# Patient Record
Sex: MALE | Race: WHITE | Employment: FULL TIME | URBAN - METROPOLITAN AREA
[De-identification: names, ages, dates, MRNs, and addresses within clinical notes are randomized per-mention and may not be internally consistent; named-entity substitution may affect disease eponyms.]

---

## 2019-10-31 ENCOUNTER — OFFICE VISIT (OUTPATIENT)
Dept: FAMILY MEDICINE CLINIC | Age: 44
End: 2019-10-31
Payer: COMMERCIAL

## 2019-10-31 VITALS
TEMPERATURE: 98.3 F | DIASTOLIC BLOOD PRESSURE: 80 MMHG | WEIGHT: 256 LBS | SYSTOLIC BLOOD PRESSURE: 132 MMHG | OXYGEN SATURATION: 98 % | HEIGHT: 73 IN | BODY MASS INDEX: 33.93 KG/M2 | RESPIRATION RATE: 13 BRPM | HEART RATE: 76 BPM

## 2019-10-31 DIAGNOSIS — G51.0 BELL'S PALSY: Primary | ICD-10-CM

## 2019-10-31 PROCEDURE — 99213 OFFICE O/P EST LOW 20 MIN: CPT | Performed by: INTERNAL MEDICINE

## 2019-10-31 RX ORDER — PREDNISONE 20 MG/1
60 TABLET ORAL DAILY
Qty: 15 TABLET | Refills: 0 | Status: SHIPPED | OUTPATIENT
Start: 2019-10-31 | End: 2019-11-05

## 2019-10-31 RX ORDER — ACYCLOVIR 400 MG/1
400 TABLET ORAL
Refills: 0 | COMMUNITY
Start: 2019-10-27 | End: 2021-01-23 | Stop reason: ALTCHOICE

## 2019-10-31 RX ORDER — PREDNISONE 50 MG/1
50 TABLET ORAL DAILY
Refills: 0 | COMMUNITY
Start: 2019-10-27 | End: 2021-01-23 | Stop reason: ALTCHOICE

## 2019-10-31 RX ORDER — VALACYCLOVIR HYDROCHLORIDE 1 G/1
1000 TABLET, FILM COATED ORAL 3 TIMES DAILY
Qty: 15 TABLET | Refills: 0 | Status: SHIPPED | OUTPATIENT
Start: 2019-10-31 | End: 2019-11-05

## 2019-10-31 ASSESSMENT — ENCOUNTER SYMPTOMS
CONSTIPATION: 0
COLOR CHANGE: 0
VOMITING: 0
BLOOD IN STOOL: 0
PHOTOPHOBIA: 0
FACIAL SWELLING: 0
NAUSEA: 0
COUGH: 0
ABDOMINAL PAIN: 0
DIARRHEA: 0
EYE DISCHARGE: 0
EYE REDNESS: 0
RECTAL PAIN: 0
CHEST TIGHTNESS: 0
VOICE CHANGE: 0
RHINORRHEA: 0
SINUS PRESSURE: 0
WHEEZING: 0
BACK PAIN: 0
APNEA: 0
SINUS PAIN: 0
ABDOMINAL DISTENTION: 0
EYE ITCHING: 0
SHORTNESS OF BREATH: 0
EYE PAIN: 0
SORE THROAT: 0
TROUBLE SWALLOWING: 0

## 2019-11-02 ENCOUNTER — TELEPHONE (OUTPATIENT)
Dept: FAMILY MEDICINE CLINIC | Age: 44
End: 2019-11-02

## 2019-11-18 ENCOUNTER — OFFICE VISIT (OUTPATIENT)
Dept: FAMILY MEDICINE CLINIC | Age: 44
End: 2019-11-18
Payer: COMMERCIAL

## 2019-11-18 VITALS
TEMPERATURE: 97.9 F | SYSTOLIC BLOOD PRESSURE: 132 MMHG | BODY MASS INDEX: 33.83 KG/M2 | DIASTOLIC BLOOD PRESSURE: 86 MMHG | OXYGEN SATURATION: 98 % | HEART RATE: 96 BPM | WEIGHT: 256.4 LBS

## 2019-11-18 DIAGNOSIS — G51.0 BELL'S PALSY: Primary | ICD-10-CM

## 2019-11-18 DIAGNOSIS — Z00.00 HEALTHCARE MAINTENANCE: ICD-10-CM

## 2019-11-18 PROCEDURE — 99213 OFFICE O/P EST LOW 20 MIN: CPT | Performed by: INTERNAL MEDICINE

## 2019-11-18 ASSESSMENT — ENCOUNTER SYMPTOMS
VOICE CHANGE: 0
SINUS PAIN: 0
TROUBLE SWALLOWING: 0
EYE ITCHING: 0
APNEA: 0
RECTAL PAIN: 0
SORE THROAT: 0
CONSTIPATION: 0
PHOTOPHOBIA: 0
ABDOMINAL PAIN: 0
NAUSEA: 0
COLOR CHANGE: 0
SINUS PRESSURE: 0
ABDOMINAL DISTENTION: 0
EYE PAIN: 0
VOMITING: 0
EYE DISCHARGE: 0
COUGH: 0
DIARRHEA: 0
SHORTNESS OF BREATH: 0
BACK PAIN: 0
CHEST TIGHTNESS: 0
RHINORRHEA: 0
EYE REDNESS: 0
WHEEZING: 0
BLOOD IN STOOL: 0
FACIAL SWELLING: 0

## 2020-01-25 DIAGNOSIS — G51.0 BELL'S PALSY: ICD-10-CM

## 2020-01-25 DIAGNOSIS — Z00.00 HEALTHCARE MAINTENANCE: ICD-10-CM

## 2020-01-25 LAB
ALBUMIN SERPL-MCNC: 4.2 G/DL (ref 3.5–4.6)
ALP BLD-CCNC: 75 U/L (ref 35–104)
ALT SERPL-CCNC: 22 U/L (ref 0–41)
ANION GAP SERPL CALCULATED.3IONS-SCNC: 11 MEQ/L (ref 9–15)
AST SERPL-CCNC: 24 U/L (ref 0–40)
BILIRUB SERPL-MCNC: <0.2 MG/DL (ref 0.2–0.7)
BUN BLDV-MCNC: 15 MG/DL (ref 6–20)
CALCIUM SERPL-MCNC: 9.1 MG/DL (ref 8.5–9.9)
CHLORIDE BLD-SCNC: 105 MEQ/L (ref 95–107)
CHOLESTEROL, TOTAL: 172 MG/DL (ref 0–199)
CO2: 26 MEQ/L (ref 20–31)
CREAT SERPL-MCNC: 1.07 MG/DL (ref 0.7–1.2)
GFR AFRICAN AMERICAN: >60
GFR NON-AFRICAN AMERICAN: >60
GLOBULIN: 2.4 G/DL (ref 2.3–3.5)
GLUCOSE BLD-MCNC: 101 MG/DL (ref 70–99)
HCT VFR BLD CALC: 42.2 % (ref 42–52)
HDLC SERPL-MCNC: 31 MG/DL (ref 40–59)
HEMOGLOBIN: 14.1 G/DL (ref 14–18)
LDL CHOLESTEROL CALCULATED: 116 MG/DL (ref 0–129)
MCH RBC QN AUTO: 28.8 PG (ref 27–31.3)
MCHC RBC AUTO-ENTMCNC: 33.4 % (ref 33–37)
MCV RBC AUTO: 86.2 FL (ref 80–100)
PDW BLD-RTO: 13.1 % (ref 11.5–14.5)
PLATELET # BLD: 341 K/UL (ref 130–400)
POTASSIUM SERPL-SCNC: 4 MEQ/L (ref 3.4–4.9)
RBC # BLD: 4.9 M/UL (ref 4.7–6.1)
SODIUM BLD-SCNC: 142 MEQ/L (ref 135–144)
TOTAL PROTEIN: 6.6 G/DL (ref 6.3–8)
TRIGL SERPL-MCNC: 127 MG/DL (ref 0–150)
WBC # BLD: 6.6 K/UL (ref 4.8–10.8)

## 2021-01-18 ENCOUNTER — OFFICE VISIT (OUTPATIENT)
Dept: FAMILY MEDICINE CLINIC | Age: 46
End: 2021-01-18
Payer: COMMERCIAL

## 2021-01-18 VITALS
DIASTOLIC BLOOD PRESSURE: 80 MMHG | BODY MASS INDEX: 32.47 KG/M2 | SYSTOLIC BLOOD PRESSURE: 110 MMHG | TEMPERATURE: 97 F | RESPIRATION RATE: 16 BRPM | OXYGEN SATURATION: 97 % | WEIGHT: 245 LBS | HEIGHT: 73 IN | HEART RATE: 96 BPM

## 2021-01-18 DIAGNOSIS — R53.83 FATIGUE, UNSPECIFIED TYPE: ICD-10-CM

## 2021-01-18 DIAGNOSIS — R22.1 NECK SWELLING: ICD-10-CM

## 2021-01-18 DIAGNOSIS — R22.1 NECK SWELLING: Primary | ICD-10-CM

## 2021-01-18 DIAGNOSIS — L98.9 SKIN LESION: ICD-10-CM

## 2021-01-18 LAB
ALBUMIN SERPL-MCNC: 4.7 G/DL (ref 3.5–4.6)
ALP BLD-CCNC: 74 U/L (ref 35–104)
ALT SERPL-CCNC: 24 U/L (ref 0–41)
ANION GAP SERPL CALCULATED.3IONS-SCNC: 9 MEQ/L (ref 9–15)
AST SERPL-CCNC: 24 U/L (ref 0–40)
BASOPHILS ABSOLUTE: 0 K/UL (ref 0–0.2)
BASOPHILS RELATIVE PERCENT: 0.3 %
BILIRUB SERPL-MCNC: 0.4 MG/DL (ref 0.2–0.7)
BUN BLDV-MCNC: 13 MG/DL (ref 6–20)
CALCIUM SERPL-MCNC: 9.4 MG/DL (ref 8.5–9.9)
CHLORIDE BLD-SCNC: 102 MEQ/L (ref 95–107)
CHOLESTEROL, TOTAL: 192 MG/DL (ref 0–199)
CO2: 27 MEQ/L (ref 20–31)
CREAT SERPL-MCNC: 1.06 MG/DL (ref 0.7–1.2)
EOSINOPHILS ABSOLUTE: 0.1 K/UL (ref 0–0.7)
EOSINOPHILS RELATIVE PERCENT: 0.9 %
GFR AFRICAN AMERICAN: >60
GFR NON-AFRICAN AMERICAN: >60
GLOBULIN: 2.6 G/DL (ref 2.3–3.5)
GLUCOSE BLD-MCNC: 75 MG/DL (ref 70–99)
HBA1C MFR BLD: 5.6 % (ref 4.8–5.9)
HCT VFR BLD CALC: 43.9 % (ref 42–52)
HDLC SERPL-MCNC: 39 MG/DL (ref 40–59)
HEMOGLOBIN: 14.9 G/DL (ref 14–18)
LDL CHOLESTEROL CALCULATED: 116 MG/DL (ref 0–129)
LYMPHOCYTES ABSOLUTE: 3 K/UL (ref 1–4.8)
LYMPHOCYTES RELATIVE PERCENT: 32.3 %
MCH RBC QN AUTO: 28.8 PG (ref 27–31.3)
MCHC RBC AUTO-ENTMCNC: 33.9 % (ref 33–37)
MCV RBC AUTO: 84.8 FL (ref 80–100)
MONOCYTES ABSOLUTE: 0.4 K/UL (ref 0.2–0.8)
MONOCYTES RELATIVE PERCENT: 4.7 %
NEUTROPHILS ABSOLUTE: 5.7 K/UL (ref 1.4–6.5)
NEUTROPHILS RELATIVE PERCENT: 61.8 %
PDW BLD-RTO: 12.7 % (ref 11.5–14.5)
PLATELET # BLD: 333 K/UL (ref 130–400)
POTASSIUM SERPL-SCNC: 4.1 MEQ/L (ref 3.4–4.9)
RBC # BLD: 5.17 M/UL (ref 4.7–6.1)
SODIUM BLD-SCNC: 138 MEQ/L (ref 135–144)
TOTAL PROTEIN: 7.3 G/DL (ref 6.3–8)
TRIGL SERPL-MCNC: 187 MG/DL (ref 0–150)
TSH REFLEX: 2.63 UIU/ML (ref 0.44–3.86)
VITAMIN D 25-HYDROXY: 17.8 NG/ML (ref 30–100)
WBC # BLD: 9.3 K/UL (ref 4.8–10.8)

## 2021-01-18 PROCEDURE — 99214 OFFICE O/P EST MOD 30 MIN: CPT | Performed by: INTERNAL MEDICINE

## 2021-01-18 RX ORDER — TERBINAFINE HYDROCHLORIDE 250 MG/1
250 TABLET ORAL DAILY
Qty: 42 TABLET | Refills: 0 | Status: SHIPPED | OUTPATIENT
Start: 2021-01-18 | End: 2021-03-01

## 2021-01-18 SDOH — ECONOMIC STABILITY: TRANSPORTATION INSECURITY
IN THE PAST 12 MONTHS, HAS THE LACK OF TRANSPORTATION KEPT YOU FROM MEDICAL APPOINTMENTS OR FROM GETTING MEDICATIONS?: NO

## 2021-01-18 SDOH — ECONOMIC STABILITY: TRANSPORTATION INSECURITY
IN THE PAST 12 MONTHS, HAS LACK OF TRANSPORTATION KEPT YOU FROM MEETINGS, WORK, OR FROM GETTING THINGS NEEDED FOR DAILY LIVING?: NO

## 2021-01-18 SDOH — ECONOMIC STABILITY: INCOME INSECURITY: HOW HARD IS IT FOR YOU TO PAY FOR THE VERY BASICS LIKE FOOD, HOUSING, MEDICAL CARE, AND HEATING?: NOT HARD AT ALL

## 2021-01-18 ASSESSMENT — ENCOUNTER SYMPTOMS
TROUBLE SWALLOWING: 0
RECTAL PAIN: 0
SINUS PRESSURE: 0
CONSTIPATION: 0
EYE DISCHARGE: 0
COLOR CHANGE: 0
BLOOD IN STOOL: 0
ABDOMINAL PAIN: 0
BACK PAIN: 0
SINUS PAIN: 0
EYE PAIN: 0
CHEST TIGHTNESS: 0
SHORTNESS OF BREATH: 0
WHEEZING: 0
SORE THROAT: 0
RHINORRHEA: 0
ABDOMINAL DISTENTION: 0
FACIAL SWELLING: 0
VOICE CHANGE: 0
NAUSEA: 0
EYE REDNESS: 0
PHOTOPHOBIA: 0
COUGH: 0
VOMITING: 0
EYE ITCHING: 0
DIARRHEA: 0
APNEA: 0

## 2021-01-18 ASSESSMENT — PATIENT HEALTH QUESTIONNAIRE - PHQ9
SUM OF ALL RESPONSES TO PHQ QUESTIONS 1-9: 0
SUM OF ALL RESPONSES TO PHQ9 QUESTIONS 1 & 2: 0
2. FEELING DOWN, DEPRESSED OR HOPELESS: 0

## 2021-01-18 NOTE — PATIENT INSTRUCTIONS
Patient Education        DASH Diet: Care Instructions  Your Care Instructions     The DASH diet is an eating plan that can help lower your blood pressure. DASH stands for Dietary Approaches to Stop Hypertension. Hypertension is high blood pressure. The DASH diet focuses on eating foods that are high in calcium, potassium, and magnesium. These nutrients can lower blood pressure. The foods that are highest in these nutrients are fruits, vegetables, low-fat dairy products, nuts, seeds, and legumes. But taking calcium, potassium, and magnesium supplements instead of eating foods that are high in those nutrients does not have the same effect. The DASH diet also includes whole grains, fish, and poultry. The DASH diet is one of several lifestyle changes your doctor may recommend to lower your high blood pressure. Your doctor may also want you to decrease the amount of sodium in your diet. Lowering sodium while following the DASH diet can lower blood pressure even further than just the DASH diet alone. Follow-up care is a key part of your treatment and safety. Be sure to make and go to all appointments, and call your doctor if you are having problems. It's also a good idea to know your test results and keep a list of the medicines you take. How can you care for yourself at home? Following the DASH diet  · Eat 4 to 5 servings of fruit each day. A serving is 1 medium-sized piece of fruit, ½ cup chopped or canned fruit, 1/4 cup dried fruit, or 4 ounces (½ cup) of fruit juice. Choose fruit more often than fruit juice. · Eat 4 to 5 servings of vegetables each day. A serving is 1 cup of lettuce or raw leafy vegetables, ½ cup of chopped or cooked vegetables, or 4 ounces (½ cup) of vegetable juice. Choose vegetables more often than vegetable juice. · Get 2 to 3 servings of low-fat and fat-free dairy each day. A serving is 8 ounces of milk, 1 cup of yogurt, or 1 ½ ounces of cheese.

## 2021-01-18 NOTE — PROGRESS NOTES
Subjective:      Patient ID: Wale Art is a 41 y.o. male who presents today for:  Chief Complaint   Patient presents with    Mass     pt states he has a lump under his chin he would like looked at       HPI     71-year-old male presents with a complaint of left lateral neck swelling. He denies constitutional symptoms such as fevers chills night sweats. The patient also voices concern regarding nuchal mycoses. He would like pharmacologic intervention to help him address this. He also voices a complaint of fatigue and would like a work-up for this concern. At present he denies polyuria,  Polydipsia, constitutional, sinus, additional visual, cardiopulmonary, gastrointestinal, immunologic/hematologic, musculoskeletal, additional neurologic,dermatologic, or psychiatric complaints. No past medical history on file.   Past Surgical History:   Procedure Laterality Date    ENDOSCOPIC ULTRASOUND (LOWER)      TIBIA FRACTURE SURGERY Left     TONSILLECTOMY      UPPER GASTROINTESTINAL ENDOSCOPY       Social History     Socioeconomic History    Marital status: Single     Spouse name: Not on file    Number of children: Not on file    Years of education: Not on file    Highest education level: Not on file   Occupational History    Not on file   Social Needs    Financial resource strain: Not hard at all    Food insecurity     Worry: Never true     Inability: Never true   [employer] needs     Medical: No     Non-medical: No   Tobacco Use    Smoking status: Former Smoker    Smokeless tobacco: Never Used   Substance and Sexual Activity    Alcohol use: Not on file    Drug use: Not on file    Sexual activity: Not on file   Lifestyle    Physical activity     Days per week: Not on file     Minutes per session: Not on file    Stress: Not on file   Relationships    Social connections     Talks on phone: Not on file     Gets together: Not on file     Attends Yazdanism service: Not on file     Active problem, joint swelling, myalgias, neck pain and neck stiffness. Skin: Negative for color change, rash and wound. Allergic/Immunologic: Negative for environmental allergies and food allergies. Neurological: Negative for dizziness, tremors, seizures, syncope, facial asymmetry, speech difficulty, weakness, light-headedness, numbness and headaches. Hematological: Negative for adenopathy. Does not bruise/bleed easily. Psychiatric/Behavioral: Negative for agitation, confusion, decreased concentration, hallucinations, self-injury, sleep disturbance and suicidal ideas. The patient is not nervous/anxious. Objective:   /80   Pulse 96   Temp 97 °F (36.1 °C)   Resp 16   Ht 6' 1\" (1.854 m)   Wt 245 lb (111.1 kg)   SpO2 97%   BMI 32.32 kg/m²     Physical Exam  Constitutional:       General: He is not in acute distress. Appearance: He is well-developed. HENT:      Head: Normocephalic. Right Ear: External ear normal.      Left Ear: External ear normal.   Eyes:      Conjunctiva/sclera: Conjunctivae normal.      Pupils: Pupils are equal, round, and reactive to light. Neck:      Musculoskeletal: Neck supple. Trachea: No tracheal deviation. Comments: Left lateral neck lymphadenopathy noted. Nonpainful to palpation. Cardiovascular:      Rate and Rhythm: Normal rate and regular rhythm. Heart sounds: Normal heart sounds. Pulmonary:      Effort: Pulmonary effort is normal. No respiratory distress. Breath sounds: Normal breath sounds. No wheezing or rales. Chest:      Chest wall: No tenderness. Abdominal:      General: Bowel sounds are normal. There is no distension. Palpations: Abdomen is soft. There is no mass. Tenderness: There is no abdominal tenderness. There is no guarding. Musculoskeletal:         General: No tenderness or deformity. Skin:     General: Skin is warm and dry. Coloration: Skin is not pale. Findings: No erythema or rash. Comments: Faint line on the left lateral elbow forearm. Neurological:      Mental Status: He is alert and oriented to person, place, and time. Psychiatric:         Thought Content: Thought content normal.         Judgment: Judgment normal.         Assessment:       Diagnosis Orders   1. Neck swelling  Hemoglobin A1C    CBC With Auto Differential    CTA NECK W WO CONTRAST    Lipid Panel   2. Fatigue, unspecified type  TSH with Reflex    Testosterone Free And Total Male    Vitamin D 25 Hydroxy    Comprehensive Metabolic Panel    Vitamin B12 & Folate   3. Skin lesion           Plan:      Pilar Boudreaux was seen today for 2 week follow-up. Diagnoses and all orders for this visit:      Left lateral neck swelling: Obtain a CT scan of the neck, CBC with differential        Fatigue: Check TSH, B12 and testosterone level, CMP  CBC with differential and vitamin D      Rash: Left arm: Trial of Eucerin    Health maintenance: We will obtain a lipid profile for further cardiovascular stratification. Return in about 1 month (around 2/18/2021). Ivelisse Garland MD    Please note, this report has been partially produced using speech recognition software  and may cause  and /or contain errors related to that system including grammar, punctuation and spelling as well as words and phrases that may seem inappropriate. If there are questions or concerns please feel free to contact me to clarify.

## 2021-01-19 LAB
SEX HORMONE BINDING GLOBULIN: 30 NMOL/L (ref 11–80)
TESTOSTERONE FREE PERCENT: 2 % (ref 1.6–2.9)
TESTOSTERONE FREE, CALC: 95 PG/ML (ref 47–244)
TESTOSTERONE TOTAL-MALE: 481 NG/DL (ref 300–890)

## 2021-01-19 RX ORDER — ERGOCALCIFEROL 1.25 MG/1
50000 CAPSULE ORAL WEEKLY
Qty: 8 CAPSULE | Refills: 0 | Status: SHIPPED | OUTPATIENT
Start: 2021-01-19 | End: 2021-03-10

## 2021-01-23 ENCOUNTER — HOSPITAL ENCOUNTER (OUTPATIENT)
Dept: CT IMAGING | Age: 46
Discharge: HOME | End: 2021-01-25
Payer: COMMERCIAL

## 2021-01-23 VITALS
RESPIRATION RATE: 16 BRPM | SYSTOLIC BLOOD PRESSURE: 131 MMHG | WEIGHT: 245 LBS | HEART RATE: 77 BPM | BODY MASS INDEX: 32.47 KG/M2 | DIASTOLIC BLOOD PRESSURE: 77 MMHG | HEIGHT: 73 IN

## 2021-01-23 DIAGNOSIS — R22.1 NECK SWELLING: ICD-10-CM

## 2021-01-23 PROCEDURE — 6360000004 HC RX CONTRAST MEDICATION: Performed by: INTERNAL MEDICINE

## 2021-01-23 PROCEDURE — 70498 CT ANGIOGRAPHY NECK: CPT

## 2021-01-23 RX ADMIN — IOPAMIDOL 100 ML: 612 INJECTION, SOLUTION INTRAVENOUS at 08:49

## 2021-02-08 ENCOUNTER — OFFICE VISIT (OUTPATIENT)
Dept: FAMILY MEDICINE CLINIC | Age: 46
End: 2021-02-08
Payer: COMMERCIAL

## 2021-02-08 VITALS
TEMPERATURE: 97 F | WEIGHT: 242 LBS | HEART RATE: 72 BPM | BODY MASS INDEX: 32.07 KG/M2 | OXYGEN SATURATION: 99 % | RESPIRATION RATE: 16 BRPM | SYSTOLIC BLOOD PRESSURE: 120 MMHG | DIASTOLIC BLOOD PRESSURE: 80 MMHG | HEIGHT: 73 IN

## 2021-02-08 DIAGNOSIS — R22.1 NECK SWELLING: ICD-10-CM

## 2021-02-08 DIAGNOSIS — R53.83 FATIGUE, UNSPECIFIED TYPE: Primary | ICD-10-CM

## 2021-02-08 PROCEDURE — 99213 OFFICE O/P EST LOW 20 MIN: CPT | Performed by: INTERNAL MEDICINE

## 2021-02-08 ASSESSMENT — ENCOUNTER SYMPTOMS
SORE THROAT: 0
FACIAL SWELLING: 0
BLOOD IN STOOL: 0
CHEST TIGHTNESS: 0
DIARRHEA: 0
EYE DISCHARGE: 0
ABDOMINAL DISTENTION: 0
APNEA: 0
BACK PAIN: 0
COUGH: 0
EYE ITCHING: 0
TROUBLE SWALLOWING: 0
VOICE CHANGE: 0
SHORTNESS OF BREATH: 0
SINUS PAIN: 0
RHINORRHEA: 0
VOMITING: 0
PHOTOPHOBIA: 0
EYE REDNESS: 0
SINUS PRESSURE: 0
RECTAL PAIN: 0
WHEEZING: 0
COLOR CHANGE: 0
NAUSEA: 0
EYE PAIN: 0
ABDOMINAL PAIN: 0
CONSTIPATION: 0

## 2021-02-08 NOTE — PROGRESS NOTES
file     Active member of club or organization: Not on file     Attends meetings of clubs or organizations: Not on file     Relationship status: Not on file    Intimate partner violence     Fear of current or ex partner: Not on file     Emotionally abused: Not on file     Physically abused: Not on file     Forced sexual activity: Not on file   Other Topics Concern    Not on file   Social History Narrative    Not on file     Family History   Problem Relation Age of Onset    Other Mother         lupus    Diabetes Father      No Known Allergies  Current Outpatient Medications on File Prior to Visit   Medication Sig Dispense Refill    vitamin D (ERGOCALCIFEROL) 1.25 MG (11528 UT) CAPS capsule Take 1 capsule by mouth once a week for 8 doses 8 capsule 0    terbinafine (LAMISIL) 250 MG tablet Take 1 tablet by mouth daily 42 tablet 0     No current facility-administered medications on file prior to visit. Review of Systems   Constitutional: Negative for chills, diaphoresis, fatigue and fever. HENT: Negative for congestion, dental problem, drooling, ear discharge, ear pain, facial swelling, hearing loss, mouth sores, nosebleeds, postnasal drip, rhinorrhea, sinus pressure, sinus pain, sneezing, sore throat, tinnitus, trouble swallowing and voice change. Eyes: Negative for photophobia, pain, discharge, redness, itching and visual disturbance. Respiratory: Negative for apnea, cough, chest tightness, shortness of breath and wheezing. Cardiovascular: Negative for chest pain, palpitations and leg swelling. Gastrointestinal: Negative for abdominal distention, abdominal pain, blood in stool, constipation, diarrhea, nausea, rectal pain and vomiting. Endocrine: Negative for cold intolerance, heat intolerance, polydipsia, polyphagia and polyuria. Genitourinary: Negative for decreased urine volume, difficulty urinating, dysuria, flank pain, frequency, genital sores, hematuria and urgency. Musculoskeletal: Negative for arthralgias, back pain, gait problem, joint swelling, myalgias, neck pain and neck stiffness. Skin: Negative for color change, rash and wound. Allergic/Immunologic: Negative for environmental allergies and food allergies. Neurological: Negative for dizziness, tremors, seizures, syncope, facial asymmetry, speech difficulty, weakness, light-headedness, numbness and headaches. Hematological: Negative for adenopathy. Does not bruise/bleed easily. Psychiatric/Behavioral: Negative for agitation, confusion, decreased concentration, hallucinations, self-injury, sleep disturbance and suicidal ideas. The patient is not nervous/anxious. Objective:   /80   Pulse 72   Temp 97 °F (36.1 °C)   Resp 16   Ht 6' 1\" (1.854 m)   Wt 242 lb (109.8 kg)   SpO2 99%   BMI 31.93 kg/m²     Physical Exam  Constitutional:       General: He is not in acute distress. Appearance: He is well-developed. HENT:      Head: Normocephalic. Right Ear: External ear normal.      Left Ear: External ear normal.   Eyes:      Conjunctiva/sclera: Conjunctivae normal.      Pupils: Pupils are equal, round, and reactive to light. Neck:      Musculoskeletal: Neck supple. Trachea: No tracheal deviation. Comments: Left lateral neck lymphadenopathy noted. Nonpainful to palpation. Cardiovascular:      Rate and Rhythm: Normal rate and regular rhythm. Heart sounds: Normal heart sounds. Pulmonary:      Effort: Pulmonary effort is normal. No respiratory distress. Breath sounds: Normal breath sounds. No wheezing or rales. Chest:      Chest wall: No tenderness. Abdominal:      General: Bowel sounds are normal. There is no distension. Palpations: Abdomen is soft. There is no mass. Tenderness: There is no abdominal tenderness. There is no guarding. Musculoskeletal:         General: No tenderness or deformity. Skin:     General: Skin is warm and dry. Coloration: Skin is not pale. Findings: No erythema or rash. Comments: Faint line on the left lateral elbow forearm. Neurological:      Mental Status: He is alert and oriented to person, place, and time. Psychiatric:         Thought Content: Thought content normal.         Judgment: Judgment normal.         Assessment:       Diagnosis Orders   1. Fatigue, unspecified type     2. Neck swelling           Plan:      Dang Craig was seen today for 2 week follow-up. Diagnoses and all orders for this visit:      Left lateral neck swelling: We will continue to monitor. Fatigue: Check TSH, B12 within normal limits. Awaiting testosterone level. Suspect obstructive sleep apnea may play a role here. Rash: Left arm: Continue Eucerin    Health maintenance: We will obtain a lipid profile for further cardiovascular stratification. Return in about 4 months (around 6/8/2021). Adonis Delarosa MD    Please note, this report has been partially produced using speech recognition software  and may cause  and /or contain errors related to that system including grammar, punctuation and spelling as well as words and phrases that may seem inappropriate. If there are questions or concerns please feel free to contact me to clarify.

## 2022-11-25 ENCOUNTER — HOSPITAL ENCOUNTER (OUTPATIENT)
Dept: MRI IMAGING | Age: 47
Discharge: HOME | End: 2022-11-27
Payer: COMMERCIAL

## 2022-11-25 DIAGNOSIS — M70.50 PATELLAR BURSITIS, UNSPECIFIED LATERALITY: ICD-10-CM

## 2022-11-25 PROCEDURE — 73721 MRI JNT OF LWR EXTRE W/O DYE: CPT

## 2022-12-01 ENCOUNTER — TELEPHONE (OUTPATIENT)
Dept: FAMILY MEDICINE CLINIC | Age: 47
End: 2022-12-01

## 2022-12-01 NOTE — TELEPHONE ENCOUNTER
----- Message from Dr. [facility] ZEINAB sent at 11/29/2022  2:39 PM EST -----  Subject: Referral Request    Reason for referral request? Lab work as needed per Doctor  Provider patient wants to be referred to(if known):     Provider Phone Number(if known): Additional Information for Provider?  Please call if appointment is needed   for Lab  ---------------------------------------------------------------------------  --------------  [id number]    [phone]; OK to leave message on voicemail  ---------------------------------------------------------------------------  --------------

## 2023-03-09 ENCOUNTER — TELEMEDICINE (OUTPATIENT)
Dept: FAMILY MEDICINE CLINIC | Age: 48
End: 2023-03-09
Payer: COMMERCIAL

## 2023-03-09 DIAGNOSIS — N52.9 ERECTILE DYSFUNCTION, UNSPECIFIED ERECTILE DYSFUNCTION TYPE: ICD-10-CM

## 2023-03-09 DIAGNOSIS — R53.83 FATIGUE, UNSPECIFIED TYPE: Primary | ICD-10-CM

## 2023-03-09 PROCEDURE — 99213 OFFICE O/P EST LOW 20 MIN: CPT | Performed by: INTERNAL MEDICINE

## 2023-03-09 SDOH — ECONOMIC STABILITY: HOUSING INSECURITY
IN THE LAST 12 MONTHS, WAS THERE A TIME WHEN YOU DID NOT HAVE A STEADY PLACE TO SLEEP OR SLEPT IN A SHELTER (INCLUDING NOW)?: NO

## 2023-03-09 SDOH — ECONOMIC STABILITY: FOOD INSECURITY: WITHIN THE PAST 12 MONTHS, THE FOOD YOU BOUGHT JUST DIDN'T LAST AND YOU DIDN'T HAVE MONEY TO GET MORE.: NEVER TRUE

## 2023-03-09 SDOH — ECONOMIC STABILITY: FOOD INSECURITY: WITHIN THE PAST 12 MONTHS, YOU WORRIED THAT YOUR FOOD WOULD RUN OUT BEFORE YOU GOT MONEY TO BUY MORE.: NEVER TRUE

## 2023-03-09 SDOH — ECONOMIC STABILITY: INCOME INSECURITY: HOW HARD IS IT FOR YOU TO PAY FOR THE VERY BASICS LIKE FOOD, HOUSING, MEDICAL CARE, AND HEATING?: NOT HARD AT ALL

## 2023-03-09 ASSESSMENT — ENCOUNTER SYMPTOMS
RECTAL PAIN: 0
VOICE CHANGE: 0
BACK PAIN: 0
SINUS PAIN: 0
CONSTIPATION: 0
DIARRHEA: 0
TROUBLE SWALLOWING: 0
VOMITING: 0
EYE PAIN: 0
SHORTNESS OF BREATH: 0
COLOR CHANGE: 0
RHINORRHEA: 0
ABDOMINAL DISTENTION: 0
EYE REDNESS: 0
WHEEZING: 0
FACIAL SWELLING: 0
NAUSEA: 0
CHEST TIGHTNESS: 0
SORE THROAT: 0
COUGH: 0
SINUS PRESSURE: 0
PHOTOPHOBIA: 0
EYE ITCHING: 0
ABDOMINAL PAIN: 0
APNEA: 0
BLOOD IN STOOL: 0
EYE DISCHARGE: 0

## 2023-03-09 ASSESSMENT — PATIENT HEALTH QUESTIONNAIRE - PHQ9
SUM OF ALL RESPONSES TO PHQ QUESTIONS 1-9: 0
SUM OF ALL RESPONSES TO PHQ9 QUESTIONS 1 & 2: 0
SUM OF ALL RESPONSES TO PHQ QUESTIONS 1-9: 0
2. FEELING DOWN, DEPRESSED OR HOPELESS: 0
1. LITTLE INTEREST OR PLEASURE IN DOING THINGS: 0

## 2023-03-09 NOTE — PROGRESS NOTES
Subjective:      Patient ID: Maylin French is a 56 y.o. male Established patient, here for evaluation of the following chief complaint(s):  No chief complaint on file. HPI  64-year-old male presents with a complaint of fatigue that is been present for several weeks and associated erectile dysfunction. At present he denies polyuria,  Polydipsia, constitutional, sinus, visual, cardiopulmonary, urologic, gastrointestinal, immunologic/hematologic, musculoskeletal, neurologic,dermatologic, or psychiatric complaints. Current Outpatient Medications on File Prior to Visit   Medication Sig Dispense Refill    vitamin D (ERGOCALCIFEROL) 1.25 MG (78881 UT) CAPS capsule Take 1 capsule by mouth once a week for 8 doses 8 capsule 0     No current facility-administered medications on file prior to visit. Patient has no known allergies. No past medical history on file.   Past Surgical History:   Procedure Laterality Date    ENDOSCOPIC ULTRASOUND (LOWER)      TIBIA FRACTURE SURGERY Left     TONSILLECTOMY      UPPER GASTROINTESTINAL ENDOSCOPY       Social History     Socioeconomic History    Marital status: Single     Spouse name: Not on file    Number of children: Not on file    Years of education: Not on file    Highest education level: Not on file   Occupational History    Not on file   Tobacco Use    Smoking status: Former    Smokeless tobacco: Never   Vaping Use    Vaping Use: Never used   Substance and Sexual Activity    Alcohol use: Not on file    Drug use: Not on file    Sexual activity: Not on file   Other Topics Concern    Not on file   Social History Narrative    Not on file     Social Determinants of Health     Financial Resource Strain: Low Risk     Difficulty of Paying Living Expenses: Not hard at all   Food Insecurity: No Food Insecurity    Worried About Running Out of Food in the Last Year: Never true    [address] in the Last Year: Never true   Transportation Needs: Unknown    Lack of Transportation (Medical): Not on file    Lack of Transportation (Non-Medical): No   Physical Activity: Not on file   Stress: Not on file   Social Connections: Not on file   Intimate Partner Violence: Not on file   Housing Stability: Unknown    Unable to Pay for Housing in the Last Year: Not on file    Number of Places Lived in the Last Year: Not on file    Unstable Housing in the Last Year: No     Family History   Problem Relation Age of Onset    Other Mother         lupus    Diabetes Father          Review of Systems   Constitutional:  Negative for chills, diaphoresis, fatigue and fever. HENT:  Negative for congestion, dental problem, drooling, ear discharge, ear pain, facial swelling, hearing loss, mouth sores, nosebleeds, postnasal drip, rhinorrhea, sinus pressure, sinus pain, sneezing, sore throat, tinnitus, trouble swallowing and voice change. Eyes:  Negative for photophobia, pain, discharge, redness, itching and visual disturbance. Respiratory:  Negative for apnea, cough, chest tightness, shortness of breath and wheezing. Cardiovascular:  Negative for chest pain, palpitations and leg swelling. Gastrointestinal:  Negative for abdominal distention, abdominal pain, blood in stool, constipation, diarrhea, nausea, rectal pain and vomiting. Endocrine: Negative for cold intolerance, heat intolerance, polydipsia, polyphagia and polyuria. Genitourinary:  Negative for decreased urine volume, difficulty urinating, dysuria, flank pain, frequency, genital sores, hematuria and urgency. Musculoskeletal:  Negative for arthralgias, back pain, gait problem, joint swelling, myalgias, neck pain and neck stiffness. Skin:  Negative for color change, rash and wound. Allergic/Immunologic: Negative for environmental allergies and food allergies. Neurological:  Negative for dizziness, tremors, seizures, syncope, facial asymmetry, speech difficulty, weakness, light-headedness, numbness and headaches. Hematological:  Negative for adenopathy. Does not bruise/bleed easily. Psychiatric/Behavioral:  Negative for agitation, confusion, decreased concentration, hallucinations, self-injury, sleep disturbance and suicidal ideas. The patient is not nervous/anxious. Objective: There were no vitals taken for this visit. Physical Exam  Constitutional:       General: He is not in acute distress. Appearance: He is well-developed. HENT:      Head: Normocephalic. Right Ear: External ear normal.      Left Ear: External ear normal.   Eyes:      Conjunctiva/sclera: Conjunctivae normal.   Neck:      Vascular: No JVD. Trachea: No tracheal deviation. Cardiovascular:      Rate and Rhythm: Normal rate and regular rhythm. Heart sounds: Normal heart sounds. Pulmonary:      Effort: Pulmonary effort is normal. No respiratory distress. Breath sounds: Normal breath sounds. No wheezing or rales. Chest:      Chest wall: No tenderness. Abdominal:      General: Bowel sounds are normal. There is no distension. Palpations: Abdomen is soft. There is no mass. Tenderness: There is no abdominal tenderness. There is no guarding or rebound. Musculoskeletal:         General: No tenderness or deformity. Cervical back: Neck supple. Skin:     General: Skin is warm and dry. Coloration: Skin is not pale. Findings: No erythema or rash. Neurological:      Mental Status: He is alert and oriented to person, place, and time. Motor: No abnormal muscle tone. Psychiatric:         Thought Content: Thought content normal.         Judgment: Judgment normal.       Assessment:       Diagnosis Orders   1. Fatigue, unspecified type  TSH    CBC with Auto Differential    Comprehensive Metabolic Panel    Vitamin D 25 Hydroxy    Vitamin B12 & Folate    Lipid Panel    Hemoglobin A1C      2.  Erectile dysfunction, unspecified erectile dysfunction type  Testosterone, free, total    External Referral to Urology           No results found for: LIPIDPAN, BMP, CMP, CBC, CBCAUTODIF  Plan:      Diagnoses and all orders for this visit:    Fatigue, unspecified type  -     TSH; Future  -     CBC with Auto Differential; Future  -     Comprehensive Metabolic Panel; Future  -     Vitamin D 25 Hydroxy; Future  -     Vitamin B12 & Folate; Future  -     Lipid Panel; Future  -     Hemoglobin A1C; Future    Erectile dysfunction, unspecified erectile dysfunction type  -     Testosterone, free, total; Future  -     External Referral to Urology       No follow-ups on file. Magaly Piedra is a 56 y.o. male evaluated via telephone on 3/9/2023 for Other (Erectile dysfunction)  . Documentation:  I communicated with the patient and/or health care decision maker about e. Details of this discussion including any medical advice provided: fatigue and erectile dysfunction    Total Time: minutes: 11-20 minutes                TELEHEALTH EVALUATION -- Audio/Visual (During AEAZK-86 public health emergency)    -   Magaly Piedra is a 56 y.o. male being evaluated by a Virtual Visit (video visit) encounter to address concerns as mentioned above. A caregiver was present when appropriate. Due to this being a TeleHealth encounter (During AMKTX-45 public health emergency), evaluation of the following organ systems was limited: Vitals/Constitutional/EENT/Resp/CV/GI//MS/Neuro/Skin/Heme-Lymph-Imm. Pursuant to the emergency declaration under the [facility], [facility] authority and the [employer] Act, this Virtual Visit was conducted with patient's (and/or legal guardian's) consent, to reduce the patient's risk of exposure to COVID-19 and provide necessary medical care.   The patient (and/or legal guardian) has also been advised to contact this office for worsening conditions or problems, and seek emergency medical treatment and/or call 911 if deemed necessary. Services were provided through a video synchronous discussion virtually to substitute for in-person clinic visit. Type of encounter was _x_ Doxy __ MyChart ___Facetime    Patient was located at their home. Provider was located at their ___ home or        __x__ office. --Nieves Bryan MD on 3/9/2023 at 7:04 PM    An electronic signature was used to authenticate this note. Siddhartha Garduno was evaluated through a synchronous (real-time) audio encounter. Patient identification was verified at the start of the visit. He (or guardian if applicable) is aware that this is a billable service, which includes applicable co-pays. This visit was conducted with the patient's (and/or legal guardian's) verbal consent. He has not had a related appointment within my department in the past 7 days or scheduled within the next 24 hours. The patient was located at Home: [place] [id number]. The provider was located at [facility] (Appt Dept): [address] [place] [address],  [address] [place] [address]. Note: not billable if this call serves to triage the patient into an appointment for the relevant concern    Nieves Bryan MD       On this date 03/09/23 I have spent 30 minutes reviewing previous notes, test results and face to face with the patient discussing the diagnosis and importance of compliance with the treatment plan. Nieves Bryan MD    Please note, this report has been partially produced using speech recognition software  and may cause  and /or contain errors related to that system including grammar, punctuation and spelling as well as words and phrases that may seem inappropriate. If there are questions or concerns please feel free to contact me to clarify.

## 2023-03-11 DIAGNOSIS — R53.83 FATIGUE, UNSPECIFIED TYPE: ICD-10-CM

## 2023-03-11 DIAGNOSIS — N52.9 ERECTILE DYSFUNCTION, UNSPECIFIED ERECTILE DYSFUNCTION TYPE: ICD-10-CM

## 2023-03-11 LAB
ALBUMIN SERPL-MCNC: 4.3 G/DL (ref 3.5–4.6)
ALP BLD-CCNC: 67 U/L (ref 35–104)
ALT SERPL-CCNC: 33 U/L (ref 0–41)
ANION GAP SERPL CALCULATED.3IONS-SCNC: 12 MEQ/L (ref 9–15)
AST SERPL-CCNC: 31 U/L (ref 0–40)
BASOPHILS ABSOLUTE: 0 K/UL (ref 0–0.2)
BASOPHILS RELATIVE PERCENT: 0.4 %
BILIRUB SERPL-MCNC: 0.3 MG/DL (ref 0.2–0.7)
BUN BLDV-MCNC: 16 MG/DL (ref 6–20)
CALCIUM SERPL-MCNC: 9.2 MG/DL (ref 8.5–9.9)
CHLORIDE BLD-SCNC: 101 MEQ/L (ref 95–107)
CHOLESTEROL, TOTAL: 181 MG/DL (ref 0–199)
CO2: 26 MEQ/L (ref 20–31)
CREAT SERPL-MCNC: 1.05 MG/DL (ref 0.7–1.2)
EOSINOPHILS ABSOLUTE: 0.1 K/UL (ref 0–0.7)
EOSINOPHILS RELATIVE PERCENT: 1.9 %
GFR SERPL CREATININE-BSD FRML MDRD: >60 ML/MIN/{1.73_M2}
GLOBULIN: 2.6 G/DL (ref 2.3–3.5)
GLUCOSE BLD-MCNC: 86 MG/DL (ref 70–99)
HBA1C MFR BLD: 5.3 % (ref 4.8–5.9)
HCT VFR BLD CALC: 44.9 % (ref 42–52)
HDLC SERPL-MCNC: 39 MG/DL (ref 40–59)
HEMOGLOBIN: 15 G/DL (ref 14–18)
LDL CHOLESTEROL CALCULATED: 124 MG/DL (ref 0–129)
LYMPHOCYTES ABSOLUTE: 2.4 K/UL (ref 1–4.8)
LYMPHOCYTES RELATIVE PERCENT: 36.1 %
MCH RBC QN AUTO: 28.4 PG (ref 27–31.3)
MCHC RBC AUTO-ENTMCNC: 33.3 % (ref 33–37)
MCV RBC AUTO: 85.1 FL (ref 79–92.2)
MONOCYTES ABSOLUTE: 0.5 K/UL (ref 0.2–0.8)
MONOCYTES RELATIVE PERCENT: 7.8 %
NEUTROPHILS ABSOLUTE: 3.6 K/UL (ref 1.4–6.5)
NEUTROPHILS RELATIVE PERCENT: 53.8 %
PDW BLD-RTO: 13.2 % (ref 11.5–14.5)
PLATELET # BLD: 341 K/UL (ref 130–400)
POTASSIUM SERPL-SCNC: 4.3 MEQ/L (ref 3.4–4.9)
RBC # BLD: 5.27 M/UL (ref 4.7–6.1)
SODIUM BLD-SCNC: 139 MEQ/L (ref 135–144)
TOTAL PROTEIN: 6.9 G/DL (ref 6.3–8)
TRIGL SERPL-MCNC: 90 MG/DL (ref 0–150)
TSH SERPL DL<=0.05 MIU/L-ACNC: 2.54 UIU/ML (ref 0.44–3.86)
WBC # BLD: 6.7 K/UL (ref 4.8–10.8)

## 2023-03-13 LAB
SEX HORMONE BINDING GLOBULIN: 26 NMOL/L (ref 11–80)
TESTOSTERONE FREE-NONMALE: 104.4 PG/ML (ref 47–244)
TESTOSTERONE TOTAL: 444 NG/DL (ref 220–1000)
VITAMIN D 25-HYDROXY: 54.6 NG/ML

## 2023-03-29 LAB
FOLATE: 5.7 NG/ML
VITAMIN B-12: 241 PG/ML (ref 232–1245)

## 2024-05-02 ENCOUNTER — TELEPHONE (OUTPATIENT)
Dept: FAMILY MEDICINE CLINIC | Age: 49
End: 2024-05-02

## 2024-05-03 DIAGNOSIS — Z13.220 LIPID SCREENING: ICD-10-CM

## 2024-05-03 DIAGNOSIS — R53.83 FATIGUE, UNSPECIFIED TYPE: ICD-10-CM

## 2024-05-03 DIAGNOSIS — Z00.00 LABORATORY EXAM ORDERED AS PART OF ROUTINE GENERAL MEDICAL EXAMINATION: Primary | ICD-10-CM

## 2025-04-26 DIAGNOSIS — R53.83 FATIGUE, UNSPECIFIED TYPE: ICD-10-CM

## 2025-04-26 DIAGNOSIS — Z13.220 LIPID SCREENING: ICD-10-CM

## 2025-04-26 DIAGNOSIS — Z00.00 LABORATORY EXAM ORDERED AS PART OF ROUTINE GENERAL MEDICAL EXAMINATION: ICD-10-CM

## 2025-04-26 LAB
ALBUMIN SERPL-MCNC: 4.2 G/DL (ref 3.5–4.6)
ALP SERPL-CCNC: 69 U/L (ref 35–104)
ALT SERPL-CCNC: 44 U/L (ref 0–41)
ANION GAP SERPL CALCULATED.3IONS-SCNC: 11 MEQ/L (ref 9–15)
AST SERPL-CCNC: 46 U/L (ref 0–40)
BILIRUB SERPL-MCNC: <0.2 MG/DL (ref 0.2–0.7)
BUN SERPL-MCNC: 12 MG/DL (ref 6–20)
CALCIUM SERPL-MCNC: 9.2 MG/DL (ref 8.5–9.9)
CHLORIDE SERPL-SCNC: 100 MEQ/L (ref 95–107)
CHOLEST SERPL-MCNC: 191 MG/DL (ref 0–199)
CO2 SERPL-SCNC: 27 MEQ/L (ref 20–31)
CREAT SERPL-MCNC: 1.12 MG/DL (ref 0.7–1.2)
ERYTHROCYTE [DISTWIDTH] IN BLOOD BY AUTOMATED COUNT: 12.2 % (ref 11.5–14.5)
GLOBULIN SER CALC-MCNC: 2.6 G/DL (ref 2.3–3.5)
GLUCOSE SERPL-MCNC: 100 MG/DL (ref 70–99)
HCT VFR BLD AUTO: 44.5 % (ref 42–52)
HDLC SERPL-MCNC: 28 MG/DL (ref 40–59)
HGB BLD-MCNC: 15.1 G/DL (ref 14–18)
LDL CHOLESTEROL: ABNORMAL MG/DL (ref 0–129)
MCH RBC QN AUTO: 28.5 PG (ref 27–31.3)
MCHC RBC AUTO-ENTMCNC: 33.9 % (ref 33–37)
MCV RBC AUTO: 84 FL (ref 79–92.2)
PLATELET # BLD AUTO: 343 K/UL (ref 130–400)
POTASSIUM SERPL-SCNC: 4.4 MEQ/L (ref 3.4–4.9)
PROT SERPL-MCNC: 6.8 G/DL (ref 6.3–8)
RBC # BLD AUTO: 5.3 M/UL (ref 4.7–6.1)
SODIUM SERPL-SCNC: 138 MEQ/L (ref 135–144)
TRIGLYCERIDE, FASTING: 456 MG/DL (ref 0–150)
TSH SERPL-MCNC: 3.17 UIU/ML (ref 0.44–3.86)
WBC # BLD AUTO: 7.4 K/UL (ref 4.8–10.8)

## 2025-04-27 LAB
SHBG SERPL-SCNC: 28 NMOL/L (ref 17–56)
TESTOST FREE SERPL-MCNC: 111.7 PG/ML (ref 47–244)
TESTOST SERPL-MCNC: 487 NG/DL (ref 249–836)
VITAMIN D 25-HYDROXY: 80 NG/ML (ref 30–100)

## 2025-04-28 ENCOUNTER — RESULTS FOLLOW-UP (OUTPATIENT)
Age: 50
End: 2025-04-28

## 2025-04-29 NOTE — TELEPHONE ENCOUNTER
Patient returned call. Relayed results to patient as written by Dr. Nix.    Patient might call back to schedule a virtual visit sooner than 05/06 to go over those results.

## 2025-04-30 NOTE — TELEPHONE ENCOUNTER
Boyd called office stating he was concerned about results not being accurate due to maybe what he ate prior and  would like to know if he could repeat bw prior to ov 5/6/25.

## 2025-05-02 NOTE — TELEPHONE ENCOUNTER
----- Message from Dr. Timothy Nix MD sent at 4/30/2025 12:43 PM EDT -----  Food would not impact the testosterone level.  ----- Message -----  From: Hien Oden MA  Sent: 4/30/2025  11:03 AM EDT  To: Timothy Nix MD

## 2025-05-02 NOTE — TELEPHONE ENCOUNTER
Called pt and notified pt of provider's response regarding food effecting results of testosterone.    Pt asks if food would have effected the triglycerides and asking if he could repeat any bw tomorrow at the hospital lab and get results to review by time of appt 5/6/25 w/ pcp Dr Nix.

## 2025-05-03 DIAGNOSIS — Z13.220 LIPID SCREENING: Primary | ICD-10-CM

## 2025-05-03 LAB
CHOLEST SERPL-MCNC: 188 MG/DL (ref 0–199)
HDLC SERPL-MCNC: 35 MG/DL (ref 40–59)
LDL CHOLESTEROL: 119 MG/DL (ref 0–129)
TRIGLYCERIDE, FASTING: 168 MG/DL (ref 0–150)

## 2025-05-05 ENCOUNTER — RESULTS FOLLOW-UP (OUTPATIENT)
Age: 50
End: 2025-05-05

## 2025-05-06 ENCOUNTER — RESULTS FOLLOW-UP (OUTPATIENT)
Age: 50
End: 2025-05-06

## 2025-05-06 ENCOUNTER — OFFICE VISIT (OUTPATIENT)
Age: 50
End: 2025-05-06
Payer: COMMERCIAL

## 2025-05-06 VITALS
BODY MASS INDEX: 34.72 KG/M2 | WEIGHT: 262 LBS | HEART RATE: 88 BPM | SYSTOLIC BLOOD PRESSURE: 130 MMHG | OXYGEN SATURATION: 94 % | DIASTOLIC BLOOD PRESSURE: 80 MMHG | HEIGHT: 73 IN | RESPIRATION RATE: 16 BRPM

## 2025-05-06 DIAGNOSIS — R79.89 ELEVATED LFTS: ICD-10-CM

## 2025-05-06 DIAGNOSIS — R35.0 FREQUENT URINATION: ICD-10-CM

## 2025-05-06 DIAGNOSIS — R53.83 FATIGUE, UNSPECIFIED TYPE: ICD-10-CM

## 2025-05-06 DIAGNOSIS — L65.9 HAIR LOSS: ICD-10-CM

## 2025-05-06 DIAGNOSIS — Z12.11 ENCOUNTER FOR COLORECTAL CANCER SCREENING: ICD-10-CM

## 2025-05-06 DIAGNOSIS — Z12.12 ENCOUNTER FOR COLORECTAL CANCER SCREENING: ICD-10-CM

## 2025-05-06 DIAGNOSIS — G47.39 SLEEP APNEA-LIKE BEHAVIOR: ICD-10-CM

## 2025-05-06 DIAGNOSIS — M25.562 LEFT KNEE PAIN, UNSPECIFIED CHRONICITY: ICD-10-CM

## 2025-05-06 DIAGNOSIS — R07.9 CHEST PAIN, UNSPECIFIED TYPE: ICD-10-CM

## 2025-05-06 DIAGNOSIS — R79.89 ELEVATED LFTS: Primary | ICD-10-CM

## 2025-05-06 LAB
ALBUMIN SERPL-MCNC: 4.3 G/DL (ref 3.5–4.6)
ALP SERPL-CCNC: 63 U/L (ref 35–104)
ALT SERPL-CCNC: 38 U/L (ref 0–41)
AST SERPL-CCNC: 34 U/L (ref 0–40)
BILIRUB DIRECT SERPL-MCNC: <0.1 MG/DL (ref 0–0.4)
BILIRUB INDIRECT SERPL-MCNC: 0.2 MG/DL (ref 0–0.6)
BILIRUB SERPL-MCNC: 0.3 MG/DL (ref 0.2–0.7)
PROT SERPL-MCNC: 7 G/DL (ref 6.3–8)
PSA SERPL-MCNC: 2.15 NG/ML (ref 0–4)

## 2025-05-06 PROCEDURE — G8427 DOCREV CUR MEDS BY ELIG CLIN: HCPCS | Performed by: INTERNAL MEDICINE

## 2025-05-06 PROCEDURE — 99214 OFFICE O/P EST MOD 30 MIN: CPT | Performed by: INTERNAL MEDICINE

## 2025-05-06 PROCEDURE — G8417 CALC BMI ABV UP PARAM F/U: HCPCS | Performed by: INTERNAL MEDICINE

## 2025-05-06 PROCEDURE — 4004F PT TOBACCO SCREEN RCVD TLK: CPT | Performed by: INTERNAL MEDICINE

## 2025-05-06 RX ORDER — ERYTHROMYCIN AND BENZOYL PEROXIDE 30; 50 MG/G; MG/G
GEL TOPICAL
Qty: 23.3 G | Refills: 0 | Status: SHIPPED | OUTPATIENT
Start: 2025-05-06 | End: 2025-06-05

## 2025-05-06 RX ORDER — DOXYCYCLINE HYCLATE 100 MG
100 TABLET ORAL 2 TIMES DAILY
Qty: 14 TABLET | Refills: 0 | Status: SHIPPED | OUTPATIENT
Start: 2025-05-06 | End: 2025-05-13

## 2025-05-06 RX ORDER — TAMSULOSIN HYDROCHLORIDE 0.4 MG/1
0.4 CAPSULE ORAL DAILY
Qty: 30 CAPSULE | Refills: 0 | Status: SHIPPED | OUTPATIENT
Start: 2025-05-06

## 2025-05-06 SDOH — ECONOMIC STABILITY: FOOD INSECURITY: WITHIN THE PAST 12 MONTHS, YOU WORRIED THAT YOUR FOOD WOULD RUN OUT BEFORE YOU GOT MONEY TO BUY MORE.: NEVER TRUE

## 2025-05-06 SDOH — ECONOMIC STABILITY: FOOD INSECURITY: WITHIN THE PAST 12 MONTHS, THE FOOD YOU BOUGHT JUST DIDN'T LAST AND YOU DIDN'T HAVE MONEY TO GET MORE.: NEVER TRUE

## 2025-05-06 ASSESSMENT — PATIENT HEALTH QUESTIONNAIRE - PHQ9
SUM OF ALL RESPONSES TO PHQ QUESTIONS 1-9: 0
SUM OF ALL RESPONSES TO PHQ QUESTIONS 1-9: 0
2. FEELING DOWN, DEPRESSED OR HOPELESS: NOT AT ALL
1. LITTLE INTEREST OR PLEASURE IN DOING THINGS: NOT AT ALL
SUM OF ALL RESPONSES TO PHQ QUESTIONS 1-9: 0
SUM OF ALL RESPONSES TO PHQ QUESTIONS 1-9: 0

## 2025-05-06 NOTE — PROGRESS NOTES
Boyd Blanco (:  1975) is a 49 y.o. male, Established patient, here for evaluation of the following chief complaint(s):  No chief complaint on file.          Subjective   History of Present Illness  The patient presents for evaluation of elevated cholesterol, low testosterone, sleep apnea, left knee pain, hair loss, chest rash, frequent urination, and chest pain.    He reports experiencing fatigue during the day, despite maintaining a daily morning workout routine of approximately one hour. He expresses interest in testosterone replacement therapy (TRT) to address his energy levels. He has not been previously diagnosed with low testosterone but is currently on a regimen of testosterone supplements. His recent testosterone level was 487.    He has a history of sleep apnea, diagnosed in his 20s, and underwent surgical intervention involving the nasal passages and uvula. He reports poor sleep quality, often waking up several times during the night and experiencing episodes of apnea. He attributes his daytime fatigue to his sleep disturbances. He does not use a CPAP machine.    He has a long-standing issue with a torn meniscus in his left knee, which has been causing him significant discomfort. He is seeking alternative treatments to manage his pain and promote muscle regeneration. He is currently taking BPC-157 peptides in pill form for muscle regeneration.    He has been dealing with a persistent rash on his chest, which occasionally flares up. He has attempted treatment with an antifungal cream, but it has not provided relief. The rash appears to worsen with excessive sweating.    He reports frequent urination, including nocturia, and has reduced his water intake due to this issue. He consumes approximately 8 cups of coffee daily.    He experiences occasional chest pain at rest but does not report any associated shortness of breath or leg swelling. He recalls a previous diagnosis of a hernia, which he

## 2025-05-07 LAB
SHBG SERPL-SCNC: 26 NMOL/L (ref 10–60)
TESTOST FREE SERPL-MCNC: 99.9 PG/ML (ref 47–244)
TESTOST SERPL-MCNC: 427 NG/DL (ref 249–836)

## 2025-05-09 DIAGNOSIS — R35.0 FREQUENT URINATION: ICD-10-CM

## 2025-05-09 LAB
BILIRUB UR QL STRIP: NEGATIVE
CLARITY UR: CLEAR
COLOR UR: YELLOW
GLUCOSE UR STRIP-MCNC: NEGATIVE MG/DL
HGB UR QL STRIP: NEGATIVE
KETONES UR STRIP-MCNC: ABNORMAL MG/DL
LEUKOCYTE ESTERASE UR QL STRIP: NEGATIVE
NITRITE UR QL STRIP: NEGATIVE
PH UR STRIP: 5.5 [PH] (ref 5–9)
PROT UR STRIP-MCNC: ABNORMAL MG/DL
SP GR UR STRIP: 1.03 (ref 1–1.03)
URINE REFLEX TO CULTURE: ABNORMAL
UROBILINOGEN UR STRIP-ACNC: 0.2 E.U./DL

## 2025-05-15 ENCOUNTER — HOSPITAL ENCOUNTER (OUTPATIENT)
Dept: CT IMAGING | Age: 50
Discharge: HOME | End: 2025-05-17
Attending: INTERNAL MEDICINE
Payer: COMMERCIAL

## 2025-05-15 DIAGNOSIS — R07.9 CHEST PAIN, UNSPECIFIED TYPE: ICD-10-CM

## 2025-05-15 PROCEDURE — 6360000004 HC RX CONTRAST MEDICATION: Performed by: INTERNAL MEDICINE

## 2025-05-15 PROCEDURE — 74177 CT ABD & PELVIS W/CONTRAST: CPT

## 2025-05-15 RX ORDER — IOPAMIDOL 755 MG/ML
75 INJECTION, SOLUTION INTRAVASCULAR
Status: COMPLETED | OUTPATIENT
Start: 2025-05-15 | End: 2025-05-15

## 2025-05-15 RX ORDER — IOPAMIDOL 755 MG/ML
18 INJECTION, SOLUTION INTRAVASCULAR
Status: COMPLETED | OUTPATIENT
Start: 2025-05-15 | End: 2025-05-15

## 2025-05-15 RX ADMIN — IOPAMIDOL 75 ML: 755 INJECTION, SOLUTION INTRAVENOUS at 09:32

## 2025-05-15 RX ADMIN — IOPAMIDOL 18 ML: 755 INJECTION, SOLUTION INTRAVENOUS at 09:31

## 2025-07-01 LAB — NONINV COLON CA DNA+OCC BLD SCRN STL QL: NEGATIVE

## 2025-07-07 ENCOUNTER — HOSPITAL ENCOUNTER (OUTPATIENT)
Age: 50
Discharge: HOME | End: 2025-07-09
Attending: INTERNAL MEDICINE
Payer: COMMERCIAL

## 2025-07-07 VITALS
DIASTOLIC BLOOD PRESSURE: 80 MMHG | HEIGHT: 73 IN | SYSTOLIC BLOOD PRESSURE: 130 MMHG | WEIGHT: 262 LBS | BODY MASS INDEX: 34.72 KG/M2

## 2025-07-07 DIAGNOSIS — R07.9 CHEST PAIN, UNSPECIFIED TYPE: ICD-10-CM

## 2025-07-07 LAB
ECHO AO ROOT DIAM: 3.3 CM
ECHO AO ROOT INDEX: 1.37 CM/M2
ECHO AV AREA PEAK VELOCITY: 2.7 CM2
ECHO AV AREA VTI: 3.1 CM2
ECHO AV AREA/BSA PEAK VELOCITY: 1.1 CM2/M2
ECHO AV AREA/BSA VTI: 1.3 CM2/M2
ECHO AV CUSP MM: 2.3 CM
ECHO AV MEAN GRADIENT: 4 MMHG
ECHO AV MEAN VELOCITY: 0.9 M/S
ECHO AV PEAK GRADIENT: 7 MMHG
ECHO AV PEAK VELOCITY: 1.3 M/S
ECHO AV VELOCITY RATIO: 0.69
ECHO AV VTI: 24.7 CM
ECHO BSA: 2.47 M2
ECHO EST RA PRESSURE: 3 MMHG
ECHO LA DIAMETER INDEX: 1.83 CM/M2
ECHO LA DIAMETER: 4.4 CM
ECHO LA TO AORTIC ROOT RATIO: 1.33
ECHO LA VOL A-L A2C: 48 ML (ref 18–58)
ECHO LA VOL A-L A4C: 62 ML (ref 18–58)
ECHO LA VOL MOD A2C: 45 ML (ref 18–58)
ECHO LA VOL MOD A4C: 58 ML (ref 18–58)
ECHO LA VOLUME AREA LENGTH: 55 ML
ECHO LA VOLUME INDEX A-L A2C: 20 ML/M2 (ref 16–34)
ECHO LA VOLUME INDEX A-L A4C: 26 ML/M2 (ref 16–34)
ECHO LA VOLUME INDEX AREA LENGTH: 23 ML/M2 (ref 16–34)
ECHO LA VOLUME INDEX MOD A2C: 19 ML/M2 (ref 16–34)
ECHO LA VOLUME INDEX MOD A4C: 24 ML/M2 (ref 16–34)
ECHO LV E' LATERAL VELOCITY: 15.44 CM/S
ECHO LV E' SEPTAL VELOCITY: 10.79 CM/S
ECHO LV EDV A2C: 71 ML
ECHO LV EDV A4C: 100 ML
ECHO LV EDV BP: 84 ML (ref 67–155)
ECHO LV EDV INDEX A4C: 41 ML/M2
ECHO LV EDV INDEX BP: 35 ML/M2
ECHO LV EDV NDEX A2C: 29 ML/M2
ECHO LV EJECTION FRACTION 3D: 60 %
ECHO LV EJECTION FRACTION A2C: 47 %
ECHO LV EJECTION FRACTION A4C: 64 %
ECHO LV EJECTION FRACTION BIPLANE: 55 % (ref 55–100)
ECHO LV ESV A2C: 38 ML
ECHO LV ESV A4C: 36 ML
ECHO LV ESV BP: 38 ML (ref 22–58)
ECHO LV ESV INDEX A2C: 16 ML/M2
ECHO LV ESV INDEX A4C: 15 ML/M2
ECHO LV ESV INDEX BP: 16 ML/M2
ECHO LV FRACTIONAL SHORTENING: 33 % (ref 28–44)
ECHO LV INTERNAL DIMENSION DIASTOLE INDEX: 2.12 CM/M2
ECHO LV INTERNAL DIMENSION DIASTOLIC: 5.1 CM (ref 4.2–5.9)
ECHO LV INTERNAL DIMENSION SYSTOLIC INDEX: 1.41 CM/M2
ECHO LV INTERNAL DIMENSION SYSTOLIC: 3.4 CM
ECHO LV IVSD: 1.3 CM (ref 0.6–1)
ECHO LV IVSS: 1.6 CM
ECHO LV MASS 2D: 227.4 G (ref 88–224)
ECHO LV MASS INDEX 2D: 94.3 G/M2 (ref 49–115)
ECHO LV POSTERIOR WALL DIASTOLIC: 1 CM (ref 0.6–1)
ECHO LV POSTERIOR WALL SYSTOLIC: 1.6 CM
ECHO LV RELATIVE WALL THICKNESS RATIO: 0.39
ECHO LVOT AREA: 4.2 CM2
ECHO LVOT AV VTI INDEX: 0.78
ECHO LVOT DIAM: 2.3 CM
ECHO LVOT MEAN GRADIENT: 2 MMHG
ECHO LVOT PEAK GRADIENT: 3 MMHG
ECHO LVOT PEAK VELOCITY: 0.9 M/S
ECHO LVOT STROKE VOLUME INDEX: 33.1 ML/M2
ECHO LVOT SV: 79.7 ML
ECHO LVOT VTI: 19.2 CM
ECHO MV A VELOCITY: 0.49 M/S
ECHO MV E DECELERATION TIME (DT): 205 MS
ECHO MV E VELOCITY: 0.52 M/S
ECHO MV E/A RATIO: 1.06
ECHO MV E/E' LATERAL: 3.37
ECHO MV E/E' RATIO (AVERAGED): 4.09
ECHO MV E/E' SEPTAL: 4.82
ECHO MV REGURGITANT PEAK GRADIENT: 100 MMHG
ECHO MV REGURGITANT PEAK VELOCITY: 5 M/S
ECHO PV MAX VELOCITY: 1.1 M/S
ECHO PV PEAK GRADIENT: 5 MMHG
ECHO RIGHT VENTRICULAR SYSTOLIC PRESSURE (RVSP): 17 MMHG
ECHO RV INTERNAL DIMENSION: 3.4 CM
ECHO RV TAPSE: 1.4 CM (ref 1.7–?)
ECHO RVOT PEAK GRADIENT: 2 MMHG
ECHO RVOT PEAK VELOCITY: 0.7 M/S
ECHO TV REGURGITANT MAX VELOCITY: 1.9 M/S
ECHO TV REGURGITANT PEAK GRADIENT: 14 MMHG

## 2025-07-07 PROCEDURE — 93306 TTE W/DOPPLER COMPLETE: CPT

## 2025-07-07 PROCEDURE — 93306 TTE W/DOPPLER COMPLETE: CPT | Performed by: INTERNAL MEDICINE

## 2025-08-21 ENCOUNTER — OFFICE VISIT (OUTPATIENT)
Age: 50
End: 2025-08-21
Payer: COMMERCIAL

## 2025-08-21 VITALS
RESPIRATION RATE: 16 BRPM | OXYGEN SATURATION: 98 % | HEIGHT: 73 IN | SYSTOLIC BLOOD PRESSURE: 116 MMHG | DIASTOLIC BLOOD PRESSURE: 82 MMHG | WEIGHT: 268 LBS | HEART RATE: 102 BPM | BODY MASS INDEX: 35.52 KG/M2

## 2025-08-21 DIAGNOSIS — R79.89 LOW TESTOSTERONE: Primary | ICD-10-CM

## 2025-08-21 DIAGNOSIS — E78.1 HYPERTRIGLYCERIDEMIA: ICD-10-CM

## 2025-08-21 DIAGNOSIS — R73.03 PREDIABETES: ICD-10-CM

## 2025-08-21 PROCEDURE — G8417 CALC BMI ABV UP PARAM F/U: HCPCS | Performed by: INTERNAL MEDICINE

## 2025-08-21 PROCEDURE — G8427 DOCREV CUR MEDS BY ELIG CLIN: HCPCS | Performed by: INTERNAL MEDICINE

## 2025-08-21 PROCEDURE — 99214 OFFICE O/P EST MOD 30 MIN: CPT | Performed by: INTERNAL MEDICINE

## 2025-08-21 PROCEDURE — 4004F PT TOBACCO SCREEN RCVD TLK: CPT | Performed by: INTERNAL MEDICINE
